# Patient Record
Sex: MALE | Race: BLACK OR AFRICAN AMERICAN | Employment: FULL TIME | URBAN - METROPOLITAN AREA
[De-identification: names, ages, dates, MRNs, and addresses within clinical notes are randomized per-mention and may not be internally consistent; named-entity substitution may affect disease eponyms.]

---

## 2017-01-11 ENCOUNTER — HOSPITAL ENCOUNTER (OUTPATIENT)
Dept: PHYSICAL THERAPY | Age: 37
Discharge: HOME | End: 2017-01-11

## 2017-01-11 NOTE — PROGRESS NOTES
[facility]   [address], [facility] [address]  [phone]   [phone]  Outpatient PHYSICAL THERAPY: Discontinuation Summary  Fall Risk Score: 1 (? 5 = High Risk)    ICD-10: Treatment Diagnosis:   M54.5 Low back pain   REFERRING PHYSICIAN: Manuela Conn MD MD Orders: Evaluate and Treat  Return Physician Appointment: PRN  MEDICAL/REFERRING DIAGNOSIS: Low back pain [M54.5]  DATE OF ONSET: September 2016   PRIOR LEVEL OF FUNCTION: Independent  PRECAUTIONS/ALLERGIES: Per Chart  ASSESSMENT:  ?????? ? ? This section established at most recent assessment??????????  PROBLEM LIST (Impairments causing functional limitations):  1. Decreased Strength affecting function  2. Decreased ADL/Functional Activities  3. Decreased Flexibility/joint mobility  4. Increased Pain affecting function  GOALS: (Goals have been discussed and agreed upon with patient.)                                                        UNABLE TO ASSESS GOALS. SHORT-TERM FUNCTIONAL GOALS: Time Frame: 4 weeks  1. Decrease Oswestry Index 2 points to allow improved ADLs with sitting and lifting. 2. Decrease AROM limits to minimal to decrease pain 1-2 levels. 3. Increase lumbar strength to decrease pain 1-2 levels. DISCHARGE GOALS: Time Frame: 8 weeks  1. Decrease Oswestry Index 4 points to allow progression to an independent home exercise program.  2. Decrease trunk AROM limits to WNL to decrease pain +2 levels. 3. Demonstrate independence in home exercises to allow DC from physical therapy.   REHABILITATION POTENTIAL FOR STATED GOALS: Latasha Bell  CARE:  INTERVENTIONS PLANNED: (Benefits and precautions of physical therapy have been discussed with the patient.)  1. home exercise program (HEP)  2. manual therapy  3. therapeutic exercise/strengthening/ROM exercises  TREATMENT PLAN EFFECTIVE DATES: 11/22/2016 TO 01/22/2017  FREQUENCY/DURATION: Follow patient 2 times a week for  weeks to address above goals, and upon reassessment will adjust frequency and duration as progress indicates. Regarding Willard Johansen's therapy, I certify that the treatment plan above will be carried out by a therapist or under their direction. Thank you for this referral,  Gem Adams, PT     Referring Physician Signature: Estrada Graham MD          Date. page       [unfilled]                                SUBJECTIVE:  History of Present Injury/Illness (Reason for Referral): The patient reported a history of low back pain since 2010, and his most recent episode in September 2016. He presents in a kyphotic posture and spends much of the day in a seated kyphotic posture. His symptoms are exacerbated with trunk flexion and decreased symptoms in standing. He has been referred to physical therapy for treatment. Present Symptoms: The patient call to report he is voluntarily DC PT. Post: 4  Dominant Side: right  Past Medical History:   Past Medical History   Diagnosis Date    Left leg paresthesias     Lipoma     Lower back injury     Muscle spasm of back    Current Medications: Per Chart   Date Last Reviewed: 1/11/2017No medication changes. Social History/Home Situation: Independent  Work/Activity History: IT repair  OBJECTIVE:  Outcome Measure: Tool Used: Modified Oswestry Low Back Pain Questionnaire  Score:  Initial: 6/50  Most Recent: X/50 (Date: -- )   Interpretation of Score: Each section is scored on a 0-5 scale, 5 representing the greatest disability. The scores of each section are added together for a total score of 50. Score 0 1-10 11-20 21-30 31-40 41-49 50   Modifier CH CI CJ CK CL CM CN     ?  Changing and Maintaining Body Position:    [id number] - CURRENT STATUS: CI - 1%-19% impaired, limited or restricted    - GOAL STATUS:  CI - 1%-19% impaired, limited or restricted    - D/C STATUS:  ---------------To be determined---------------    Observation/Orthostatic Postural Assessment: Kyphotic. Palpation:  Tenderness with palpation to the patient's lumbar spine. ROM: TRUNK AROM   Moderate Limits in Lumbar AROM. Strength: LUMBAR SPINE   -4/5 for lumbar strength. Special Tests: N/A  Neurological Screen: No LE radiculopathy reported. Functional Mobility: Independent   Balance:  Good. Treatment Assessment:  The patient voluntarily DC PT. Recommendations/Intent for next treatment session: Voluntary DC PT.    Total Treatment Duration:       Celia Murguia, PT